# Patient Record
Sex: FEMALE | Race: WHITE | NOT HISPANIC OR LATINO
[De-identification: names, ages, dates, MRNs, and addresses within clinical notes are randomized per-mention and may not be internally consistent; named-entity substitution may affect disease eponyms.]

---

## 2019-06-08 ENCOUNTER — TRANSCRIPTION ENCOUNTER (OUTPATIENT)
Age: 19
End: 2019-06-08

## 2020-03-18 ENCOUNTER — TRANSCRIPTION ENCOUNTER (OUTPATIENT)
Age: 20
End: 2020-03-18

## 2023-01-25 ENCOUNTER — EMERGENCY (EMERGENCY)
Facility: HOSPITAL | Age: 23
LOS: 0 days | Discharge: HOME | End: 2023-01-25
Attending: EMERGENCY MEDICINE | Admitting: EMERGENCY MEDICINE
Payer: MEDICAID

## 2023-01-25 VITALS
OXYGEN SATURATION: 97 % | HEART RATE: 88 BPM | SYSTOLIC BLOOD PRESSURE: 113 MMHG | RESPIRATION RATE: 16 BRPM | TEMPERATURE: 99 F | DIASTOLIC BLOOD PRESSURE: 59 MMHG

## 2023-01-25 PROCEDURE — 99284 EMERGENCY DEPT VISIT MOD MDM: CPT

## 2023-01-25 NOTE — CONSULT NOTE ADULT - SUBJECTIVE AND OBJECTIVE BOX
Patient is a 22y old  Female who presents with a chief complaint of "I have pain everywhere in my mouth and my dentist told me to get my wisdom teeth out"     HPI: Patient reports extreme pain throughout mouth persistent for the last week. She reports that her dentist told her she needs deep cleanings but before her cleanings she needs to extract her wisdom teeth. Patient was prescribed antibiotics and pain medication by her private dentist.       PAST MEDICAL & SURGICAL HISTORY:    ( -  ) heart valve replacement  (   -) joint replacement  ( -  ) pregnancy    MEDICATIONS  (STANDING):    MEDICATIONS  (PRN):    Allergies  No Known Allergies    *SOCIAL HISTORY: ( -  ) Tobacco; (  - ) ETOH    *Last Dental Visit: 3 days ago; patient was told she needs deep cleaning but it will be done after wisdom teeth extraction    Vital Signs Last 24 Hrs  T(C): 37.3 (25 Jan 2023 08:22), Max: 37.3 (25 Jan 2023 08:22)  T(F): 99.1 (25 Jan 2023 08:22), Max: 99.1 (25 Jan 2023 08:22)  HR: 88 (25 Jan 2023 08:22) (88 - 88)  BP: 113/59 (25 Jan 2023 08:22) (113/59 - 113/59)  BP(mean): --  RR: 16 (25 Jan 2023 08:22) (16 - 16)  SpO2: 97% (25 Jan 2023 08:22) (97% - 97%)    Parameters below as of 25 Jan 2023 08:22  Patient On (Oxygen Delivery Method): room air      EOE:  TMJ (  - ) clicks                     ( -  ) pops                     ( -  ) crepitus             Mandible <<FROM>>             Facial bones and MOM <<grossly intact>>             ( +  ) trismus, guarding from pain             ( -  ) lymphadenopathy             (  - ) swelling             ( -  ) asymmetry             ( -  ) palpation             ( -  ) dyspnea             ( -  ) dysphagia             ( -  ) loss of consciousness    IOE:  permanent dentition: grossly intact, inflamed gingiva with erythema           hard/soft palate:  ( -  ) palatal torus, <<No pathology noted>>           tongue/FOM <<No pathology noted>>           labial/buccal mucosa <<No pathology noted>>           ( -  ) percussion           ( -  ) palpation           ( -  ) swelling            ( -  ) abscess           ( -  ) sinus tract    Dentition present: <<  y >>  Mobility: << n >>  Caries: << n  >>         *DENTAL RADIOGRAPHS: 4 PA of third molars, no significant findings

## 2023-01-25 NOTE — CONSULT NOTE ADULT - ASSESSMENT
*ASSESSMENT: 23 y/o patient presents to ED with diffuse pain in mouth persistent for the last week. Extraoral exam shows no significant findings. Intraoral exam shows extremely erythematous gingiva sensitive to palpation, lips tender to palpation, lip laceration due to patient biting her own lip accidentally, halitosis. Radiographic exam shows no significant findings. Patient informed that her pain is most likely not due to the third molars since there are no clinical and radiographic pathological findings. Her current pain is most likely due to poor oral hygiene and lack of professional cleaning for the past two years. Patient advised to return to private dentist and have comprehensive care especially professional cleaning and possibly SRP to control gingival inflammation. Patient expresses understanding.       RECOMMENDATIONS:  1) Continue abx  2) Dental F/U with outpatient dentist for comprehensive dental care and professional plaque control.    3) If any difficulty swallowing/breathing, fever occur, return to ER.     Burak Rosenberg DMD, pager #[phone]

## 2023-01-25 NOTE — ED PROVIDER NOTE - CLINICAL SUMMARY MEDICAL DECISION MAKING FREE TEXT BOX
22-year-old female presents emergency department for dental pain.  No sign of airway compromise or infection.  Patient walked to dental clinic.

## 2023-01-25 NOTE — ED PROVIDER NOTE - OBJECTIVE STATEMENT
22-year-old female with past medical history of depression presents to the emergency department for dental pain that started last Wednesday.  Patient states last all 4 wisdom teeth.  No swelling.  Worse with eating.  No fever no chills no drooling.

## 2023-01-25 NOTE — ED PROVIDER NOTE - PHYSICAL EXAMINATION
Const: NAD  Eyes: PERRL, no conjunctival injection  HENT:  Neck supple without meningismus, cold sore on bottom lip, gingivitis, no dental abscess.   CV: RRR, Warm, well-perfused extremities  RESP: CTA B/L, no tachypnea   MSK: No gross deformities appreciated  Skin: Warm, dry. No rashes

## 2023-01-26 DIAGNOSIS — S01.511A LACERATION WITHOUT FOREIGN BODY OF LIP, INITIAL ENCOUNTER: ICD-10-CM
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
## 2023-01-26 DIAGNOSIS — F32.A DEPRESSION, UNSPECIFIED: ICD-10-CM

## 2023-01-26 DIAGNOSIS — K08.89 OTHER SPECIFIED DISORDERS OF TEETH AND SUPPORTING STRUCTURES: ICD-10-CM

## 2023-01-26 DIAGNOSIS — K06.9 DISORDER OF GINGIVA AND EDENTULOUS ALVEOLAR RIDGE, UNSPECIFIED: ICD-10-CM

## 2023-10-23 PROBLEM — Z00.00 ENCOUNTER FOR PREVENTIVE HEALTH EXAMINATION: Status: ACTIVE | Noted: 2023-10-23

## 2023-12-20 ENCOUNTER — APPOINTMENT (OUTPATIENT)
Dept: OBGYN | Facility: CLINIC | Age: 23
End: 2023-12-20